# Patient Record
Sex: MALE | Race: WHITE | URBAN - METROPOLITAN AREA
[De-identification: names, ages, dates, MRNs, and addresses within clinical notes are randomized per-mention and may not be internally consistent; named-entity substitution may affect disease eponyms.]

---

## 2020-07-13 PROBLEM — Z00.00 ENCOUNTER FOR PREVENTIVE HEALTH EXAMINATION: Status: ACTIVE | Noted: 2020-07-13

## 2022-06-23 ENCOUNTER — OFFICE (OUTPATIENT)
Dept: URBAN - METROPOLITAN AREA CLINIC 12 | Facility: CLINIC | Age: 57
Setting detail: OPHTHALMOLOGY
End: 2022-06-23
Payer: OTHER MISCELLANEOUS

## 2022-06-23 DIAGNOSIS — H52.13: ICD-10-CM

## 2022-06-23 PROCEDURE — SCREF LASIK EVAL: Performed by: OPHTHALMOLOGY

## 2022-06-23 ASSESSMENT — TONOMETRY
OD_IOP_MMHG: 21
OS_IOP_MMHG: 15

## 2022-06-23 ASSESSMENT — REFRACTION_MANIFEST
OS_CYLINDER: -0.75
OS_AXIS: 72
OD_VA1: 20/20
OD_SPHERE: +2.00
OD_AXIS: 094
OD_CYLINDER: -0.75
OS_VA1: 20/20
OS_SPHERE: +1.25

## 2022-06-23 ASSESSMENT — KERATOMETRY
OS_K1POWER_DIOPTERS: 42.25
OD_K1POWER_DIOPTERS: 41.75
OD_AXISANGLE_DEGREES: 7
OS_K2POWER_DIOPTERS: 42.75
OS_AXISANGLE_DEGREES: 158
OD_K2POWER_DIOPTERS: 42.50

## 2022-06-23 ASSESSMENT — REFRACTION_CURRENTRX
OD_OVR_VA: 20/
OS_OVR_VA: 20/
OD_VPRISM_DIRECTION: SV
OD_AXIS: 88
OD_SPHERE: +1.75
OS_VPRISM_DIRECTION: SV
OS_SPHERE: +1.50
OS_CYLINDER: -0.50
OD_CYLINDER: -0.25
OS_AXIS: 71

## 2022-06-23 ASSESSMENT — SPHEQUIV_DERIVED
OS_SPHEQUIV: 0.875
OS_SPHEQUIV: 1.125
OD_SPHEQUIV: 1.625
OD_SPHEQUIV: 1.625

## 2022-06-23 ASSESSMENT — AXIALLENGTH_DERIVED
OS_AL: 23.5211
OD_AL: 23.4645
OD_AL: 23.4645
OS_AL: 23.6183

## 2022-06-23 ASSESSMENT — PACHYMETRY
OS_CT_UM: 532
OD_CT_UM: 533
OS_CT_CORRECTION: 1
OD_CT_CORRECTION: 1

## 2022-06-23 ASSESSMENT — REFRACTION_AUTOREFRACTION
OS_SPHERE: +1.50
OS_CYLINDER: -0.75
OD_AXIS: 94
OD_CYLINDER: -0.75
OS_AXIS: 72
OD_SPHERE: +2.00

## 2022-06-23 ASSESSMENT — VISUAL ACUITY
OD_BCVA: 20/20
OS_BCVA: 20/20

## 2022-06-23 ASSESSMENT — CONFRONTATIONAL VISUAL FIELD TEST (CVF)
OD_FINDINGS: FULL
OS_FINDINGS: FULL

## 2022-06-29 ENCOUNTER — OTHER LOCATION (OUTPATIENT)
Dept: URBAN - METROPOLITAN AREA LASIK CENTER 6 | Facility: LASIK CENTER | Age: 57
Setting detail: OPHTHALMOLOGY
End: 2022-06-29

## 2022-06-29 DIAGNOSIS — H52.13: ICD-10-CM

## 2022-06-29 PROCEDURE — 65760 KERATOMILEUSIS: CPT | Performed by: OPHTHALMOLOGY

## 2022-06-30 ENCOUNTER — OFFICE (OUTPATIENT)
Dept: URBAN - METROPOLITAN AREA CLINIC 12 | Facility: CLINIC | Age: 57
Setting detail: OPHTHALMOLOGY
End: 2022-06-30
Payer: OTHER MISCELLANEOUS

## 2022-06-30 DIAGNOSIS — H52.13: ICD-10-CM

## 2022-06-30 PROCEDURE — 99024 POSTOP FOLLOW-UP VISIT: CPT | Performed by: OPHTHALMOLOGY

## 2022-06-30 ASSESSMENT — KERATOMETRY
OD_K2POWER_DIOPTERS: 44.00
OD_K1POWER_DIOPTERS: 43.50
OS_K1POWER_DIOPTERS: 43.00
OS_AXISANGLE_DEGREES: 070
OD_AXISANGLE_DEGREES: 005
OS_K2POWER_DIOPTERS: 43.50

## 2022-06-30 ASSESSMENT — REFRACTION_AUTOREFRACTION
OS_SPHERE: +0.25
OS_AXIS: 164
OD_CYLINDER: -0.50
OD_SPHERE: -0.25
OD_AXIS: 005
OS_CYLINDER: -0.75

## 2022-06-30 ASSESSMENT — VISUAL ACUITY
OD_BCVA: 20/20
OS_BCVA: 20/20

## 2022-06-30 ASSESSMENT — REFRACTION_MANIFEST
OD_SPHERE: +2.00
OS_VA1: 20/20
OS_AXIS: 72
OS_CYLINDER: -0.75
OD_VA1: 20/20
OD_CYLINDER: -0.75
OD_AXIS: 094
OS_SPHERE: +1.25

## 2022-06-30 ASSESSMENT — SPHEQUIV_DERIVED
OS_SPHEQUIV: -0.125
OD_SPHEQUIV: 1.625
OS_SPHEQUIV: 0.875
OD_SPHEQUIV: -0.5

## 2022-06-30 ASSESSMENT — REFRACTION_CURRENTRX
OS_VPRISM_DIRECTION: SV
OS_OVR_VA: 20/
OS_SPHERE: +1.50
OS_AXIS: 71
OD_CYLINDER: -0.25
OD_AXIS: 88
OD_OVR_VA: 20/
OD_VPRISM_DIRECTION: SV
OS_CYLINDER: -0.50
OD_SPHERE: +1.75

## 2022-06-30 ASSESSMENT — AXIALLENGTH_DERIVED
OD_AL: 23.6953
OS_AL: 23.7333
OD_AL: 22.8886
OS_AL: 23.3454

## 2022-06-30 ASSESSMENT — CONFRONTATIONAL VISUAL FIELD TEST (CVF)
OD_FINDINGS: FULL
OS_FINDINGS: FULL

## 2022-07-06 ENCOUNTER — RX ONLY (RX ONLY)
Age: 57
End: 2022-07-06

## 2022-07-06 ENCOUNTER — OFFICE (OUTPATIENT)
Dept: URBAN - METROPOLITAN AREA CLINIC 12 | Facility: CLINIC | Age: 57
Setting detail: OPHTHALMOLOGY
End: 2022-07-06
Payer: OTHER MISCELLANEOUS

## 2022-07-06 DIAGNOSIS — H52.13: ICD-10-CM

## 2022-07-06 PROCEDURE — 99024 POSTOP FOLLOW-UP VISIT: CPT | Performed by: OPTOMETRIST

## 2022-07-06 ASSESSMENT — AXIALLENGTH_DERIVED
OS_AL: 23.4357
OD_AL: 22.9319
OS_AL: 23.777

## 2022-07-06 ASSESSMENT — REFRACTION_AUTOREFRACTION
OS_CYLINDER: -0.50
OS_SPHERE: +0.25
OD_SPHERE: PLANO
OD_AXIS: 3
OD_CYLINDER: -0.50
OS_AXIS: 173

## 2022-07-06 ASSESSMENT — REFRACTION_MANIFEST
OS_VA1: 20/20
OS_AXIS: 72
OD_CYLINDER: -0.75
OS_CYLINDER: -0.75
OD_SPHERE: +2.00
OD_AXIS: 094
OS_SPHERE: +1.25
OD_VA1: 20/20

## 2022-07-06 ASSESSMENT — KERATOMETRY
OD_K1POWER_DIOPTERS: 43.50
OD_AXISANGLE_DEGREES: 89
OS_K1POWER_DIOPTERS: 42.75
OS_K2POWER_DIOPTERS: 43.25
OD_K2POWER_DIOPTERS: 43.75
OS_AXISANGLE_DEGREES: 71

## 2022-07-06 ASSESSMENT — PACHYMETRY
OD_CT_UM: 533
OS_CT_CORRECTION: 1
OD_CT_CORRECTION: 1
OS_CT_UM: 532

## 2022-07-06 ASSESSMENT — REFRACTION_CURRENTRX
OS_OVR_VA: 20/
OD_VPRISM_DIRECTION: SV
OD_OVR_VA: 20/
OD_SPHERE: +1.75
OD_CYLINDER: -0.25
OD_AXIS: 88
OS_CYLINDER: -0.50
OS_VPRISM_DIRECTION: SV
OS_SPHERE: +1.50
OS_AXIS: 71

## 2022-07-06 ASSESSMENT — VISUAL ACUITY
OD_BCVA: 20/20+2
OS_BCVA: 20/15-2

## 2022-07-06 ASSESSMENT — SPHEQUIV_DERIVED
OS_SPHEQUIV: 0
OS_SPHEQUIV: 0.875
OD_SPHEQUIV: 1.625

## 2022-07-06 ASSESSMENT — CONFRONTATIONAL VISUAL FIELD TEST (CVF)
OS_FINDINGS: FULL
OD_FINDINGS: FULL

## 2022-07-06 ASSESSMENT — TONOMETRY
OS_IOP_MMHG: 15
OD_IOP_MMHG: 13

## 2022-07-28 ENCOUNTER — RX ONLY (RX ONLY)
Age: 57
End: 2022-07-28

## 2022-07-28 ENCOUNTER — OFFICE (OUTPATIENT)
Dept: URBAN - METROPOLITAN AREA CLINIC 12 | Facility: CLINIC | Age: 57
Setting detail: OPHTHALMOLOGY
End: 2022-07-28
Payer: OTHER MISCELLANEOUS

## 2022-07-28 DIAGNOSIS — H16.223: ICD-10-CM

## 2022-07-28 PROCEDURE — 99024 POSTOP FOLLOW-UP VISIT: CPT | Performed by: OPTOMETRIST

## 2022-07-28 ASSESSMENT — CORNEAL TRAUMA: OS_TRAUMA: SMALL ABRASION

## 2022-07-28 ASSESSMENT — REFRACTION_CURRENTRX
OD_SPHERE: +1.75
OS_CYLINDER: -0.50
OD_VPRISM_DIRECTION: SV
OS_VPRISM_DIRECTION: SV
OD_CYLINDER: -0.25
OD_OVR_VA: 20/
OS_AXIS: 71
OS_SPHERE: +1.50
OS_OVR_VA: 20/
OD_AXIS: 88

## 2022-07-28 ASSESSMENT — KERATOMETRY
OD_K1POWER_DIOPTERS: 43.50
OD_K2POWER_DIOPTERS: 44.00
METHOD_AUTO_MANUAL: AUTO
OD_AXISANGLE_DEGREES: 067
OS_K2POWER_DIOPTERS: 43.50
OS_AXISANGLE_DEGREES: 076
OS_K1POWER_DIOPTERS: 42.75

## 2022-07-28 ASSESSMENT — REFRACTION_MANIFEST
OS_AXIS: 72
OD_AXIS: 094
OS_CYLINDER: -0.75
OS_VA1: 20/20
OD_VA1: 20/20
OD_SPHERE: +2.00
OS_SPHERE: +1.25
OD_CYLINDER: -0.75

## 2022-07-28 ASSESSMENT — TONOMETRY
OS_IOP_MMHG: 17
OD_IOP_MMHG: 21

## 2022-07-28 ASSESSMENT — SUPERFICIAL PUNCTATE KERATITIS (SPK)
OS_SPK: 1+
OD_SPK: 1+

## 2022-07-28 ASSESSMENT — SPHEQUIV_DERIVED
OS_SPHEQUIV: 0.875
OS_SPHEQUIV: 0
OD_SPHEQUIV: 1.625

## 2022-07-28 ASSESSMENT — AXIALLENGTH_DERIVED
OS_AL: 23.3905
OD_AL: 22.8886
OS_AL: 23.7305

## 2022-07-28 ASSESSMENT — PACHYMETRY
OD_CT_CORRECTION: 1
OS_CT_CORRECTION: 1
OS_CT_UM: 532
OD_CT_UM: 533

## 2022-07-28 ASSESSMENT — REFRACTION_AUTOREFRACTION
OD_CYLINDER: -0.50
OD_SPHERE: PLANO
OD_AXIS: 176
OS_AXIS: 001
OS_CYLINDER: -0.50
OS_SPHERE: +0.25

## 2022-07-28 ASSESSMENT — VISUAL ACUITY
OD_BCVA: 20/20-1
OS_BCVA: 20/20-1

## 2022-07-28 ASSESSMENT — CONFRONTATIONAL VISUAL FIELD TEST (CVF)
OS_FINDINGS: FULL
OD_FINDINGS: FULL

## 2022-08-03 ENCOUNTER — OFFICE (OUTPATIENT)
Dept: URBAN - METROPOLITAN AREA CLINIC 12 | Facility: CLINIC | Age: 57
Setting detail: OPHTHALMOLOGY
End: 2022-08-03
Payer: OTHER MISCELLANEOUS

## 2022-08-03 DIAGNOSIS — H16.223: ICD-10-CM

## 2022-08-03 PROCEDURE — 99024 POSTOP FOLLOW-UP VISIT: CPT | Performed by: OPTOMETRIST

## 2022-08-03 ASSESSMENT — REFRACTION_CURRENTRX
OS_CYLINDER: -0.50
OS_OVR_VA: 20/
OD_CYLINDER: -0.25
OD_SPHERE: +1.75
OS_AXIS: 71
OD_OVR_VA: 20/
OD_VPRISM_DIRECTION: SV
OD_AXIS: 88
OS_SPHERE: +1.50
OS_VPRISM_DIRECTION: SV

## 2022-08-03 ASSESSMENT — REFRACTION_AUTOREFRACTION
OD_CYLINDER: -0.25
OD_SPHERE: PLANO
OS_AXIS: 176
OS_CYLINDER: -0.50
OD_AXIS: 179
OS_SPHERE: +0.75

## 2022-08-03 ASSESSMENT — REFRACTION_MANIFEST
OD_VA1: 20/20
OD_SPHERE: +2.00
OD_AXIS: 094
OD_CYLINDER: -0.75
OS_SPHERE: +1.25
OS_CYLINDER: -0.75
OS_AXIS: 72
OS_VA1: 20/20

## 2022-08-03 ASSESSMENT — KERATOMETRY
OD_K2POWER_DIOPTERS: 43.75
OS_K1POWER_DIOPTERS: 42.75
METHOD_AUTO_MANUAL: AUTO
OD_AXISANGLE_DEGREES: 072
OS_K2POWER_DIOPTERS: 43.25
OD_K1POWER_DIOPTERS: 43.25
OS_AXISANGLE_DEGREES: 070

## 2022-08-03 ASSESSMENT — SUPERFICIAL PUNCTATE KERATITIS (SPK)
OS_SPK: T
OD_SPK: T

## 2022-08-03 ASSESSMENT — SPHEQUIV_DERIVED
OS_SPHEQUIV: 0.5
OS_SPHEQUIV: 0.875
OD_SPHEQUIV: 1.625

## 2022-08-03 ASSESSMENT — PACHYMETRY
OD_CT_CORRECTION: 1
OS_CT_CORRECTION: 1
OS_CT_UM: 532
OD_CT_UM: 533

## 2022-08-03 ASSESSMENT — CONFRONTATIONAL VISUAL FIELD TEST (CVF)
OS_FINDINGS: FULL
OD_FINDINGS: FULL

## 2022-08-03 ASSESSMENT — AXIALLENGTH_DERIVED
OD_AL: 22.9753
OS_AL: 23.4357
OS_AL: 23.5808

## 2022-08-03 ASSESSMENT — TONOMETRY
OS_IOP_MMHG: 15
OD_IOP_MMHG: 20

## 2022-08-03 ASSESSMENT — VISUAL ACUITY
OD_BCVA: 20/15-2
OS_BCVA: 20/20

## 2022-08-10 ENCOUNTER — OFFICE (OUTPATIENT)
Dept: URBAN - METROPOLITAN AREA CLINIC 12 | Facility: CLINIC | Age: 57
Setting detail: OPHTHALMOLOGY
End: 2022-08-10
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
## 2022-08-10 PROCEDURE — NO SHOW FE NO SHOW FEE: Performed by: OPTOMETRIST

## 2022-09-22 ENCOUNTER — NON-APPOINTMENT (OUTPATIENT)
Age: 57
End: 2022-09-22

## 2022-09-22 RX ORDER — FLUTICASONE PROPIONATE 50 MCG
50 SPRAY, SUSPENSION NASAL
Refills: 0 | Status: ACTIVE | COMMUNITY

## 2022-09-22 RX ORDER — FEXOFENADINE HCL 60 MG
TABLET ORAL
Refills: 0 | Status: ACTIVE | COMMUNITY

## 2022-10-04 ENCOUNTER — APPOINTMENT (OUTPATIENT)
Dept: PEDIATRIC ALLERGY IMMUNOLOGY | Facility: CLINIC | Age: 57
End: 2022-10-04

## 2022-10-04 PROCEDURE — 95117 IMMUNOTHERAPY INJECTIONS: CPT

## 2022-10-04 PROCEDURE — 95165 ANTIGEN THERAPY SERVICES: CPT

## 2022-11-02 ENCOUNTER — APPOINTMENT (OUTPATIENT)
Dept: PEDIATRIC ALLERGY IMMUNOLOGY | Facility: CLINIC | Age: 57
End: 2022-11-02

## 2022-11-02 ENCOUNTER — OFFICE (OUTPATIENT)
Dept: URBAN - METROPOLITAN AREA CLINIC 12 | Facility: CLINIC | Age: 57
Setting detail: OPHTHALMOLOGY
End: 2022-11-02
Payer: OTHER MISCELLANEOUS

## 2022-11-02 ENCOUNTER — RX ONLY (RX ONLY)
Age: 57
End: 2022-11-02

## 2022-11-02 DIAGNOSIS — H16.223: ICD-10-CM

## 2022-11-02 PROBLEM — H52.13 LASIK- P/O LASIK; BOTH EYES: Status: ACTIVE | Noted: 2022-06-23

## 2022-11-02 PROBLEM — H11.153 PINGUECULA; BOTH EYES: Status: ACTIVE | Noted: 2022-06-23

## 2022-11-02 PROCEDURE — 95117 IMMUNOTHERAPY INJECTIONS: CPT

## 2022-11-02 PROCEDURE — 99024 POSTOP FOLLOW-UP VISIT: CPT | Performed by: OPTOMETRIST

## 2022-11-02 ASSESSMENT — KERATOMETRY
OD_K2POWER_DIOPTERS: 43.50
OD_K1POWER_DIOPTERS: 43.25
METHOD_AUTO_MANUAL: AUTO
OS_K1POWER_DIOPTERS: 42.75
OD_AXISANGLE_DEGREES: 077
OS_AXISANGLE_DEGREES: 076
OS_K2POWER_DIOPTERS: 43.50

## 2022-11-02 ASSESSMENT — REFRACTION_MANIFEST
OS_SPHERE: +1.25
OS_VA1: 20/20
OD_SPHERE: +2.00
OD_CYLINDER: -0.75
OS_CYLINDER: -0.75
OS_AXIS: 72
OD_AXIS: 094
OD_VA1: 20/20

## 2022-11-02 ASSESSMENT — PACHYMETRY
OS_CT_UM: 532
OD_CT_CORRECTION: 1
OD_CT_UM: 533
OS_CT_CORRECTION: 1

## 2022-11-02 ASSESSMENT — REFRACTION_CURRENTRX
OD_SPHERE: +1.75
OS_AXIS: 71
OS_OVR_VA: 20/
OD_AXIS: 88
OD_CYLINDER: -0.25
OS_SPHERE: +1.50
OD_OVR_VA: 20/
OS_VPRISM_DIRECTION: SV
OD_VPRISM_DIRECTION: SV
OS_CYLINDER: -0.50

## 2022-11-02 ASSESSMENT — TONOMETRY
OS_IOP_MMHG: 14
OD_IOP_MMHG: 18

## 2022-11-02 ASSESSMENT — CONFRONTATIONAL VISUAL FIELD TEST (CVF)
OS_FINDINGS: FULL
OD_FINDINGS: FULL

## 2022-11-02 ASSESSMENT — SPHEQUIV_DERIVED
OS_SPHEQUIV: 0.875
OS_SPHEQUIV: 0.125
OD_SPHEQUIV: 1.625

## 2022-11-02 ASSESSMENT — REFRACTION_AUTOREFRACTION
OD_SPHERE: PLANO
OS_AXIS: 178
OS_SPHERE: +0.50
OD_AXIS: 004
OS_CYLINDER: -0.75
OD_CYLINDER: -0.50

## 2022-11-02 ASSESSMENT — AXIALLENGTH_DERIVED
OS_AL: 23.3905
OD_AL: 23.019
OS_AL: 23.6813

## 2022-11-02 ASSESSMENT — SUPERFICIAL PUNCTATE KERATITIS (SPK)
OD_SPK: T
OS_SPK: T

## 2022-11-02 ASSESSMENT — VISUAL ACUITY
OD_BCVA: 20/15-3
OS_BCVA: 20/15-2

## 2022-11-29 ENCOUNTER — APPOINTMENT (OUTPATIENT)
Dept: PEDIATRIC ALLERGY IMMUNOLOGY | Facility: CLINIC | Age: 57
End: 2022-11-29

## 2022-11-29 PROCEDURE — 95117 IMMUNOTHERAPY INJECTIONS: CPT

## 2022-12-19 ENCOUNTER — APPOINTMENT (OUTPATIENT)
Dept: PEDIATRIC ALLERGY IMMUNOLOGY | Facility: CLINIC | Age: 57
End: 2022-12-19

## 2022-12-19 PROCEDURE — 95117 IMMUNOTHERAPY INJECTIONS: CPT

## 2022-12-19 PROCEDURE — 95165 ANTIGEN THERAPY SERVICES: CPT

## 2023-01-17 ENCOUNTER — APPOINTMENT (OUTPATIENT)
Dept: PULMONOLOGY | Facility: CLINIC | Age: 58
End: 2023-01-17

## 2023-01-17 ENCOUNTER — APPOINTMENT (OUTPATIENT)
Dept: PEDIATRIC ALLERGY IMMUNOLOGY | Facility: CLINIC | Age: 58
End: 2023-01-17
Payer: COMMERCIAL

## 2023-01-17 PROCEDURE — 95117 IMMUNOTHERAPY INJECTIONS: CPT

## 2023-02-14 ENCOUNTER — APPOINTMENT (OUTPATIENT)
Dept: PEDIATRIC ALLERGY IMMUNOLOGY | Facility: CLINIC | Age: 58
End: 2023-02-14
Payer: COMMERCIAL

## 2023-02-14 PROCEDURE — 95117 IMMUNOTHERAPY INJECTIONS: CPT

## 2023-03-15 ENCOUNTER — APPOINTMENT (OUTPATIENT)
Dept: PEDIATRIC ALLERGY IMMUNOLOGY | Facility: CLINIC | Age: 58
End: 2023-03-15
Payer: COMMERCIAL

## 2023-03-15 DIAGNOSIS — J30.1 ALLERGIC RHINITIS DUE TO POLLEN: ICD-10-CM

## 2023-03-15 DIAGNOSIS — J30.89 OTHER ALLERGIC RHINITIS: ICD-10-CM

## 2023-03-15 PROCEDURE — 95117 IMMUNOTHERAPY INJECTIONS: CPT

## 2023-04-12 ENCOUNTER — APPOINTMENT (OUTPATIENT)
Dept: PEDIATRIC ALLERGY IMMUNOLOGY | Facility: CLINIC | Age: 58
End: 2023-04-12
Payer: COMMERCIAL

## 2023-04-12 VITALS
DIASTOLIC BLOOD PRESSURE: 70 MMHG | OXYGEN SATURATION: 97 % | HEART RATE: 88 BPM | TEMPERATURE: 98 F | SYSTOLIC BLOOD PRESSURE: 126 MMHG | HEIGHT: 69.5 IN | BODY MASS INDEX: 33.59 KG/M2 | WEIGHT: 232 LBS

## 2023-04-12 DIAGNOSIS — G47.30 SLEEP APNEA, UNSPECIFIED: ICD-10-CM

## 2023-04-12 DIAGNOSIS — I10 ESSENTIAL (PRIMARY) HYPERTENSION: ICD-10-CM

## 2023-04-12 DIAGNOSIS — K21.9 GASTRO-ESOPHAGEAL REFLUX DISEASE W/OUT ESOPHAGITIS: ICD-10-CM

## 2023-04-12 PROCEDURE — 99213 OFFICE O/P EST LOW 20 MIN: CPT | Mod: 25

## 2023-04-12 PROCEDURE — 95117 IMMUNOTHERAPY INJECTIONS: CPT

## 2023-04-12 RX ORDER — FLUTICASONE PROPIONATE 50 UG/1
50 SPRAY, METERED NASAL TWICE DAILY
Qty: 3 | Refills: 3 | Status: ACTIVE | COMMUNITY
Start: 2023-04-12 | End: 1900-01-01

## 2023-04-12 NOTE — REVIEW OF SYSTEMS
[Nasal Congestion] : nasal congestion [Post Nasal Drip] : post nasal drip [Recurrent Sinus Infections] : no recurrent sinus infections [Recurrent Throat Infections] : no recurrence of throat infections [Recurrent Bronchitis] : no recurrent bronchitis [Recurrent Ear Infections] : no recurrence or ear infections [Recurrent Skin Infections] : no recurrent skin infections [Recurrent Pneumonia] : no ~T recurrent pneumonia

## 2023-04-12 NOTE — SOCIAL HISTORY
[de-identified] : House with carpet in the bedroom, window air conditioning, baseboard heating, 2 dogs, dust mite covers on mattress, no cigarette smoke exposure.

## 2023-04-12 NOTE — PHYSICAL EXAM
[Alert] : alert [No Acute Distress] : no acute distress [Normal Voice/Communication] : normal voice communication [Supple] : the neck was supple [Soft] : abdomen soft [Normal Cervical Lymph Nodes] : cervical [Skin Intact] : skin intact  [No clubbing] : no clubbing [No Cyanosis] : no cyanosis [Alert, Awake, Oriented as Age-Appropriate] : alert, awake, oriented as age appropriate [de-identified] : Mild conjunctival erythema bilaterally. [de-identified] : Throat clear.  Nasal mucosa pink, mild stuffy nose, scant white discharge.  Tympanic membranes dull bilaterally.  No sinus tenderness. [de-identified] : Chest clear, good air entry, no wheezing or crackles. [de-identified] : S1-S2 regular, no murmurs.

## 2023-04-12 NOTE — HISTORY OF PRESENT ILLNESS
[de-identified] : In office for yearly reevaluation for immunotherapy.  Followed in office from 3/17/1998, when he was evaluated for recurrent sinus infections and headaches.  He had allergies since childhood, with good response on immunotherapy for about 20 years.  When immunotherapy was stopped, within 1 year he developed recurrent sinus infections.  Resume immunotherapy in our office in April 1998.  Currently receiving immunotherapy with extracts of trees, weeds, grass, molds, and dust mites.  He carries EpiPen and he takes an antihistamine on immunotherapy day, due to high reactivity to molds and dust mites.  He tolerates immunotherapy well.  He feels immunotherapy is very helpful, he did not have sinus infections and he gets only occasional mild postnasal drip and stuffiness.  He uses Claritin on immunotherapy day and Flonase as needed.  He has no chest symptoms.\par Other medical history includes hypertension controlled on amlodipine, gastroesophageal reflux for which he uses omeprazole as needed, obstructive sleep apnea treated with a dental mouthpiece.  He had Lasik surgery.

## 2023-04-12 NOTE — ASSESSMENT
[FreeTextEntry1] : Allergic rhinitis (mold, dust mites, pollen): Continue immunotherapy every 4 weeks, since it is helpful to control symptoms.  Take Claritin as needed and on immunotherapy day.  Consider using Flonase 1 spray per nostril twice daily when he has increased symptoms rather than Claritin.\par Recurrent sinusitis: Much improved on immunotherapy, no sinus infections anymore.

## 2023-05-02 ENCOUNTER — OFFICE (OUTPATIENT)
Dept: URBAN - METROPOLITAN AREA CLINIC 12 | Facility: CLINIC | Age: 58
Setting detail: OPHTHALMOLOGY
End: 2023-05-02
Payer: OTHER MISCELLANEOUS

## 2023-05-02 DIAGNOSIS — H16.223: ICD-10-CM

## 2023-05-02 PROCEDURE — 99024 POSTOP FOLLOW-UP VISIT: CPT | Performed by: OPTOMETRIST

## 2023-05-02 ASSESSMENT — REFRACTION_MANIFEST
OD_SPHERE: +2.00
OS_CYLINDER: -0.75
OD_VA1: 20/20
OS_SPHERE: +1.25
OD_AXIS: 094
OD_CYLINDER: -0.75
OS_AXIS: 72
OS_VA1: 20/20

## 2023-05-02 ASSESSMENT — REFRACTION_CURRENTRX
OD_OVR_VA: 20/
OS_SPHERE: +1.50
OS_CYLINDER: -0.50
OS_AXIS: 71
OD_AXIS: 88
OD_CYLINDER: -0.25
OS_OVR_VA: 20/
OD_VPRISM_DIRECTION: SV
OD_SPHERE: +1.75
OS_VPRISM_DIRECTION: SV

## 2023-05-02 ASSESSMENT — PACHYMETRY
OD_CT_UM: 533
OS_CT_CORRECTION: 1
OS_CT_UM: 532
OD_CT_CORRECTION: 1

## 2023-05-02 ASSESSMENT — REFRACTION_AUTOREFRACTION
OD_CYLINDER: -0.50
OS_AXIS: 172
OD_AXIS: 020
OS_SPHERE: +0.50
OD_SPHERE: PLANO
OS_CYLINDER: -0.75

## 2023-05-02 ASSESSMENT — AXIALLENGTH_DERIVED
OS_AL: 23.3454
OS_AL: 23.6351
OD_AL: 22.9753

## 2023-05-02 ASSESSMENT — SPHEQUIV_DERIVED
OS_SPHEQUIV: 0.875
OS_SPHEQUIV: 0.125
OD_SPHEQUIV: 1.625

## 2023-05-02 ASSESSMENT — KERATOMETRY
OS_K2POWER_DIOPTERS: 43.50
OS_AXISANGLE_DEGREES: 075
OD_K1POWER_DIOPTERS: 43.50
METHOD_AUTO_MANUAL: AUTO
OD_AXISANGLE_DEGREES: 090
OD_K2POWER_DIOPTERS: 43.50
OS_K1POWER_DIOPTERS: 43.00

## 2023-05-02 ASSESSMENT — SUPERFICIAL PUNCTATE KERATITIS (SPK)
OS_SPK: T
OD_SPK: T

## 2023-05-02 ASSESSMENT — CONFRONTATIONAL VISUAL FIELD TEST (CVF)
OS_FINDINGS: FULL
OD_FINDINGS: FULL

## 2023-05-02 ASSESSMENT — TONOMETRY
OS_IOP_MMHG: 12
OD_IOP_MMHG: 17

## 2023-05-02 ASSESSMENT — VISUAL ACUITY
OS_BCVA: 20/20
OD_BCVA: 20/20-2

## 2023-05-08 ENCOUNTER — APPOINTMENT (OUTPATIENT)
Dept: PEDIATRIC ALLERGY IMMUNOLOGY | Facility: CLINIC | Age: 58
End: 2023-05-08
Payer: COMMERCIAL

## 2023-05-08 PROCEDURE — 95117 IMMUNOTHERAPY INJECTIONS: CPT

## 2023-06-05 ENCOUNTER — APPOINTMENT (OUTPATIENT)
Dept: PEDIATRIC ALLERGY IMMUNOLOGY | Facility: CLINIC | Age: 58
End: 2023-06-05
Payer: COMMERCIAL

## 2023-06-05 PROCEDURE — 95117 IMMUNOTHERAPY INJECTIONS: CPT

## 2023-06-05 PROCEDURE — 95165 ANTIGEN THERAPY SERVICES: CPT

## 2023-06-29 ENCOUNTER — APPOINTMENT (OUTPATIENT)
Dept: PEDIATRIC ALLERGY IMMUNOLOGY | Facility: CLINIC | Age: 58
End: 2023-06-29
Payer: COMMERCIAL

## 2023-06-29 PROCEDURE — 95117 IMMUNOTHERAPY INJECTIONS: CPT

## 2023-07-24 ENCOUNTER — EMERGENCY (EMERGENCY)
Facility: HOSPITAL | Age: 58
LOS: 1 days | Discharge: HOME | End: 2023-07-24
Attending: EMERGENCY MEDICINE
Payer: COMMERCIAL

## 2023-07-24 VITALS
DIASTOLIC BLOOD PRESSURE: 90 MMHG | WEIGHT: 229.94 LBS | SYSTOLIC BLOOD PRESSURE: 145 MMHG | RESPIRATION RATE: 17 BRPM | OXYGEN SATURATION: 97 % | TEMPERATURE: 98 F | HEART RATE: 78 BPM

## 2023-07-24 PROCEDURE — 73140 X-RAY EXAM OF FINGER(S): CPT

## 2023-07-24 PROCEDURE — 99283 EMERGENCY DEPT VISIT LOW MDM: CPT

## 2023-07-24 PROCEDURE — 73140 X-RAY EXAM OF FINGER(S): CPT | Mod: 26,LT

## 2023-07-24 PROCEDURE — 99284 EMERGENCY DEPT VISIT MOD MDM: CPT

## 2023-07-24 RX ORDER — ASPIRIN/CALCIUM CARB/MAGNESIUM 324 MG
1 TABLET ORAL
Refills: 0 | DISCHARGE

## 2023-07-24 RX ORDER — CEPHALEXIN 500 MG
1 CAPSULE ORAL
Qty: 28 | Refills: 0
Start: 2023-07-24 | End: 2023-08-06

## 2023-07-24 RX ORDER — AMLODIPINE BESYLATE 2.5 MG/1
0 TABLET ORAL
Refills: 0 | DISCHARGE

## 2023-07-24 NOTE — ED PROVIDER NOTE - NSFOLLOWUPINSTRUCTIONS_ED_ALL_ED_FT
Please follow up with a hand specialist/doctor about your finger.    Laceration    A laceration is a cut that goes through all of the layers of the skin and into the tissue that is right under the skin. Some lacerations heal on their own. Others need to be closed with skin adhesive strips, skin glue, stitches (sutures), or staples. Proper laceration care minimizes the risk of infection and helps the laceration to heal better.  If non-absorbable stitches or staples have been placed, they must be taken out within the time frame instructed by your healthcare provider.    SEEK IMMEDIATE MEDICAL CARE IF YOU HAVE ANY OF THE FOLLOWING SYMPTOMS: swelling around the wound, worsening pain, drainage from the wound, red streaking going away from your wound, inability to move finger or toe near the laceration, or discoloration of skin near the laceration.    Fracture    A fracture is a break in one of your bones. This can occur from a variety of injuries, especially traumatic ones. Symptoms include pain, bruising, or swelling. Do not use the injured limb. If a fracture is in one of the bones below your waist, do not put weight on that limb unless instructed to do so by your healthcare provider. Crutches or a cane may have been provided. A splint or cast may have been applied by your health care provider. Make sure to keep it dry and follow up with an orthopedist as instructed.    SEEK IMMEDIATE MEDICAL CARE IF YOU HAVE ANY OF THE FOLLOWING SYMPTOMS: numbness, tingling, increasing pain, or weakness in any part of the injured limb.

## 2023-07-24 NOTE — ED PROVIDER NOTE - CLINICAL SUMMARY MEDICAL DECISION MAKING FREE TEXT BOX
nearly 24 hour old crush to the left ring finger sent by  for avulsion and nail bed injury. tetanus was utd at . PMS intact. nail bed avulsed from the alteral side and cracked. no active bleeding. distal sensation intact. xray with distal avulsion. will treat as open fracture. nail attached at beside and splinted   advised on wound care abx coverage and fu with hand referral

## 2023-07-24 NOTE — ED ADULT TRIAGE NOTE - CHIEF COMPLAINT QUOTE
sent by [facility] for hand specialist consult ; + fx of left 4th digit phalanx fx, avulsion of nail s/p "tip got crushed btwn 2 pieces of metal ". rcvd tetanus and rx for keflex in [facility].

## 2023-07-24 NOTE — ED PROVIDER NOTE - PROVIDER TOKENS
PROVIDER:[TOKEN:[[id number]],FOLLOWUP:[Urgent]],PROVIDER:[TOKEN:[[id number]],FOLLOWUP:[Urgent]]

## 2023-07-24 NOTE — ED PROVIDER NOTE - PROGRESS NOTE DETAILS
DIANE SAMUEL: nearly 24 hour old crush to the left ring finger sent by  for avulsion and nail bed injury. tetanus was utd at . PMS intact. nail bed avulsed from the alteral side and cracked. no active bleeding. distal sensation intact. xray with distal avulsion. will treat as open fracture. nail attached at beside and splinted   advised on wound care abx coverage and fu with hand referral

## 2023-07-24 NOTE — ED PROVIDER NOTE - CARE PROVIDER_API CALL
Mary Puentes  Orthopaedic Surgery  [address] [place] [address]  Phone: [phone]  Fax: [phone]  Follow Up Time: Urgent    Harry Allan  Orthopaedic Surgery  [address] [place] [address]  Phone: [phone]  Fax: [phone]  Follow Up Time: Urgent

## 2023-07-24 NOTE — ED PROVIDER NOTE - NS ED ATTENDING STATEMENT MOD
This was a shared visit with the MEÑO. I reviewed and verified the documentation and independently performed the documented:

## 2023-07-24 NOTE — ED PROVIDER NOTE - SKIN WOUND TYPE
Attempted to reach patient to schedule appointment (OB). LVM with call back number.   
4th digit of LEFT hand has nail avulsion, nail is still attached at the base/avulsion(s)

## 2023-07-24 NOTE — ED PROVIDER NOTE - ADDITIONAL NOTES AND INSTRUCTIONS:
limited duty please, fracture to the left finger, needs splint in place and dressing on finger while working until cleared by orthopedics

## 2023-07-24 NOTE — ED PROVIDER NOTE - PATIENT PORTAL LINK FT
You can access the FollowMyHealth Patient Portal offered by [facility] by registering at the following website: [email or web address]. By joining [employer]’s FollowMyHealth portal, you will also be able to view your health information using other applications (apps) compatible with our system.

## 2023-07-24 NOTE — ED PROVIDER NOTE - OBJECTIVE STATEMENT
57 year old male reports to the ED from  Urgent Care in [place] s/p crush injury to the 4th digit of the LEFT hand yesterday. Patient states that he was loading a truck with heavy metals when some of the metal fell onto his hand, crushing his ring finger and causing an avulsion of the nail. Patient states that he cleansed the area with peroxide when he got home and went to urgent care the day after (today). Patient's finger was bandaged up with Xerofoam and gauze before being told to report to the ED for further care. Presently, patient complains of mild pain to the left distal finger and nail. No numbness, weakness, loss of sensation or other injuries. Tetanus vaccination updated at the Urgent care, patient given 500 mg KEFLEX at 9:50 AM.

## 2023-07-25 ENCOUNTER — APPOINTMENT (OUTPATIENT)
Dept: PEDIATRIC ALLERGY IMMUNOLOGY | Facility: CLINIC | Age: 58
End: 2023-07-25
Payer: COMMERCIAL

## 2023-07-25 PROBLEM — I10 ESSENTIAL (PRIMARY) HYPERTENSION: Chronic | Status: ACTIVE | Noted: 2023-07-24

## 2023-07-25 PROCEDURE — 95117 IMMUNOTHERAPY INJECTIONS: CPT

## 2023-07-25 NOTE — ED POST DISCHARGE NOTE - REASON FOR FOLLOW-UP
Other XR abnl, FB in finger, notes nothing broke off, possibility of something past, is a , NICK f/u today, feeling a lot better and was splinted, informed of fracture

## 2023-08-22 ENCOUNTER — APPOINTMENT (OUTPATIENT)
Dept: PEDIATRIC ALLERGY IMMUNOLOGY | Facility: CLINIC | Age: 58
End: 2023-08-22
Payer: COMMERCIAL

## 2023-08-22 PROCEDURE — 95117 IMMUNOTHERAPY INJECTIONS: CPT

## 2023-09-19 ENCOUNTER — APPOINTMENT (OUTPATIENT)
Dept: PEDIATRIC ALLERGY IMMUNOLOGY | Facility: CLINIC | Age: 58
End: 2023-09-19
Payer: COMMERCIAL

## 2023-09-19 PROCEDURE — 95117 IMMUNOTHERAPY INJECTIONS: CPT

## 2023-10-17 ENCOUNTER — APPOINTMENT (OUTPATIENT)
Dept: PEDIATRIC ALLERGY IMMUNOLOGY | Facility: CLINIC | Age: 58
End: 2023-10-17
Payer: COMMERCIAL

## 2023-10-17 PROCEDURE — 95117 IMMUNOTHERAPY INJECTIONS: CPT

## 2023-11-14 ENCOUNTER — APPOINTMENT (OUTPATIENT)
Dept: PEDIATRIC ALLERGY IMMUNOLOGY | Facility: CLINIC | Age: 58
End: 2023-11-14
Payer: COMMERCIAL

## 2023-11-14 PROCEDURE — 95117 IMMUNOTHERAPY INJECTIONS: CPT

## 2023-11-14 PROCEDURE — 95165 ANTIGEN THERAPY SERVICES: CPT

## 2023-12-13 ENCOUNTER — APPOINTMENT (OUTPATIENT)
Dept: PEDIATRIC ALLERGY IMMUNOLOGY | Facility: CLINIC | Age: 58
End: 2023-12-13
Payer: COMMERCIAL

## 2023-12-13 PROCEDURE — 95117 IMMUNOTHERAPY INJECTIONS: CPT

## 2024-01-10 ENCOUNTER — APPOINTMENT (OUTPATIENT)
Dept: PEDIATRIC ALLERGY IMMUNOLOGY | Facility: CLINIC | Age: 59
End: 2024-01-10
Payer: COMMERCIAL

## 2024-01-10 PROCEDURE — 95117 IMMUNOTHERAPY INJECTIONS: CPT

## 2024-02-07 ENCOUNTER — APPOINTMENT (OUTPATIENT)
Dept: PEDIATRIC ALLERGY IMMUNOLOGY | Facility: CLINIC | Age: 59
End: 2024-02-07
Payer: COMMERCIAL

## 2024-02-07 PROCEDURE — 95117 IMMUNOTHERAPY INJECTIONS: CPT

## 2024-03-06 ENCOUNTER — APPOINTMENT (OUTPATIENT)
Dept: PEDIATRIC ALLERGY IMMUNOLOGY | Facility: CLINIC | Age: 59
End: 2024-03-06
Payer: COMMERCIAL

## 2024-03-06 PROCEDURE — 95117 IMMUNOTHERAPY INJECTIONS: CPT

## 2024-04-02 ENCOUNTER — APPOINTMENT (OUTPATIENT)
Dept: PEDIATRIC ALLERGY IMMUNOLOGY | Facility: CLINIC | Age: 59
End: 2024-04-02
Payer: COMMERCIAL

## 2024-04-02 PROCEDURE — 95117 IMMUNOTHERAPY INJECTIONS: CPT

## 2024-05-01 ENCOUNTER — OFFICE (OUTPATIENT)
Dept: URBAN - METROPOLITAN AREA CLINIC 12 | Facility: CLINIC | Age: 59
Setting detail: OPHTHALMOLOGY
End: 2024-05-01
Payer: COMMERCIAL

## 2024-05-01 ENCOUNTER — APPOINTMENT (OUTPATIENT)
Dept: PEDIATRIC ALLERGY IMMUNOLOGY | Facility: CLINIC | Age: 59
End: 2024-05-01
Payer: COMMERCIAL

## 2024-05-01 DIAGNOSIS — H16.223: ICD-10-CM

## 2024-05-01 PROCEDURE — 92014 COMPRE OPH EXAM EST PT 1/>: CPT | Performed by: OPTOMETRIST

## 2024-05-01 PROCEDURE — 95117 IMMUNOTHERAPY INJECTIONS: CPT

## 2024-05-01 ASSESSMENT — CONFRONTATIONAL VISUAL FIELD TEST (CVF)
OS_FINDINGS: FULL
OD_FINDINGS: FULL

## 2024-05-29 ENCOUNTER — APPOINTMENT (OUTPATIENT)
Dept: PEDIATRIC ALLERGY IMMUNOLOGY | Facility: CLINIC | Age: 59
End: 2024-05-29
Payer: COMMERCIAL

## 2024-05-29 PROCEDURE — 95117 IMMUNOTHERAPY INJECTIONS: CPT

## 2024-06-26 ENCOUNTER — APPOINTMENT (OUTPATIENT)
Dept: PEDIATRIC ALLERGY IMMUNOLOGY | Facility: CLINIC | Age: 59
End: 2024-06-26
Payer: COMMERCIAL

## 2024-06-26 DIAGNOSIS — J30.89 OTHER ALLERGIC RHINITIS: ICD-10-CM

## 2024-06-26 DIAGNOSIS — J30.1 ALLERGIC RHINITIS DUE TO POLLEN: ICD-10-CM

## 2024-06-26 DIAGNOSIS — J32.9 CHRONIC SINUSITIS, UNSPECIFIED: ICD-10-CM

## 2024-06-26 PROCEDURE — 95117 IMMUNOTHERAPY INJECTIONS: CPT

## 2024-07-15 ENCOUNTER — APPOINTMENT (OUTPATIENT)
Dept: PEDIATRIC ALLERGY IMMUNOLOGY | Facility: CLINIC | Age: 59
End: 2024-07-15
Payer: COMMERCIAL

## 2024-07-15 PROCEDURE — 95165 ANTIGEN THERAPY SERVICES: CPT

## 2024-07-24 ENCOUNTER — APPOINTMENT (OUTPATIENT)
Dept: PEDIATRIC ALLERGY IMMUNOLOGY | Facility: CLINIC | Age: 59
End: 2024-07-24
Payer: COMMERCIAL

## 2024-07-24 DIAGNOSIS — J30.1 ALLERGIC RHINITIS DUE TO POLLEN: ICD-10-CM

## 2024-07-24 DIAGNOSIS — J32.9 CHRONIC SINUSITIS, UNSPECIFIED: ICD-10-CM

## 2024-07-24 DIAGNOSIS — J30.89 OTHER ALLERGIC RHINITIS: ICD-10-CM

## 2024-07-24 PROCEDURE — 95117 IMMUNOTHERAPY INJECTIONS: CPT

## 2024-08-22 ENCOUNTER — APPOINTMENT (OUTPATIENT)
Dept: PEDIATRIC ALLERGY IMMUNOLOGY | Facility: CLINIC | Age: 59
End: 2024-08-22
Payer: COMMERCIAL

## 2024-08-22 DIAGNOSIS — J30.89 OTHER ALLERGIC RHINITIS: ICD-10-CM

## 2024-08-22 DIAGNOSIS — J30.1 ALLERGIC RHINITIS DUE TO POLLEN: ICD-10-CM

## 2024-08-22 DIAGNOSIS — J32.9 CHRONIC SINUSITIS, UNSPECIFIED: ICD-10-CM

## 2024-08-22 PROCEDURE — 95117 IMMUNOTHERAPY INJECTIONS: CPT

## 2024-09-18 ENCOUNTER — APPOINTMENT (OUTPATIENT)
Dept: PEDIATRIC ALLERGY IMMUNOLOGY | Facility: CLINIC | Age: 59
End: 2024-09-18
Payer: COMMERCIAL

## 2024-09-18 DIAGNOSIS — J30.1 ALLERGIC RHINITIS DUE TO POLLEN: ICD-10-CM

## 2024-09-18 DIAGNOSIS — J32.9 CHRONIC SINUSITIS, UNSPECIFIED: ICD-10-CM

## 2024-09-18 DIAGNOSIS — J30.89 OTHER ALLERGIC RHINITIS: ICD-10-CM

## 2024-09-18 PROCEDURE — 95117 IMMUNOTHERAPY INJECTIONS: CPT

## 2024-10-16 ENCOUNTER — APPOINTMENT (OUTPATIENT)
Dept: PEDIATRIC ALLERGY IMMUNOLOGY | Facility: CLINIC | Age: 59
End: 2024-10-16
Payer: COMMERCIAL

## 2024-10-16 DIAGNOSIS — J30.89 OTHER ALLERGIC RHINITIS: ICD-10-CM

## 2024-10-16 DIAGNOSIS — J30.1 ALLERGIC RHINITIS DUE TO POLLEN: ICD-10-CM

## 2024-10-16 DIAGNOSIS — J32.9 CHRONIC SINUSITIS, UNSPECIFIED: ICD-10-CM

## 2024-10-16 PROCEDURE — 95117 IMMUNOTHERAPY INJECTIONS: CPT

## 2024-11-12 ENCOUNTER — APPOINTMENT (OUTPATIENT)
Dept: PEDIATRIC ALLERGY IMMUNOLOGY | Facility: CLINIC | Age: 59
End: 2024-11-12
Payer: COMMERCIAL

## 2024-11-12 DIAGNOSIS — J30.89 OTHER ALLERGIC RHINITIS: ICD-10-CM

## 2024-11-12 DIAGNOSIS — J30.1 ALLERGIC RHINITIS DUE TO POLLEN: ICD-10-CM

## 2024-11-12 DIAGNOSIS — J32.9 CHRONIC SINUSITIS, UNSPECIFIED: ICD-10-CM

## 2024-11-12 PROCEDURE — 95117 IMMUNOTHERAPY INJECTIONS: CPT

## 2024-11-13 ENCOUNTER — APPOINTMENT (OUTPATIENT)
Dept: PEDIATRIC ALLERGY IMMUNOLOGY | Facility: CLINIC | Age: 59
End: 2024-11-13

## 2024-12-10 ENCOUNTER — APPOINTMENT (OUTPATIENT)
Dept: PEDIATRIC ALLERGY IMMUNOLOGY | Facility: CLINIC | Age: 59
End: 2024-12-10
Payer: COMMERCIAL

## 2024-12-10 PROCEDURE — 95117 IMMUNOTHERAPY INJECTIONS: CPT

## 2024-12-16 ENCOUNTER — APPOINTMENT (OUTPATIENT)
Dept: PEDIATRIC ALLERGY IMMUNOLOGY | Facility: CLINIC | Age: 59
End: 2024-12-16
Payer: COMMERCIAL

## 2024-12-16 DIAGNOSIS — J30.89 OTHER ALLERGIC RHINITIS: ICD-10-CM

## 2024-12-16 DIAGNOSIS — J30.1 ALLERGIC RHINITIS DUE TO POLLEN: ICD-10-CM

## 2024-12-16 DIAGNOSIS — J32.9 CHRONIC SINUSITIS, UNSPECIFIED: ICD-10-CM

## 2024-12-16 PROCEDURE — 95165 ANTIGEN THERAPY SERVICES: CPT

## 2025-01-07 ENCOUNTER — APPOINTMENT (OUTPATIENT)
Dept: PEDIATRIC ALLERGY IMMUNOLOGY | Facility: CLINIC | Age: 60
End: 2025-01-07
Payer: COMMERCIAL

## 2025-01-07 DIAGNOSIS — J30.1 ALLERGIC RHINITIS DUE TO POLLEN: ICD-10-CM

## 2025-01-07 DIAGNOSIS — J30.89 OTHER ALLERGIC RHINITIS: ICD-10-CM

## 2025-01-07 DIAGNOSIS — J32.9 CHRONIC SINUSITIS, UNSPECIFIED: ICD-10-CM

## 2025-01-07 PROCEDURE — 95117 IMMUNOTHERAPY INJECTIONS: CPT

## 2025-01-22 ENCOUNTER — APPOINTMENT (OUTPATIENT)
Dept: PEDIATRIC ALLERGY IMMUNOLOGY | Facility: CLINIC | Age: 60
End: 2025-01-22
Payer: COMMERCIAL

## 2025-01-22 DIAGNOSIS — J30.1 ALLERGIC RHINITIS DUE TO POLLEN: ICD-10-CM

## 2025-01-22 DIAGNOSIS — J32.9 CHRONIC SINUSITIS, UNSPECIFIED: ICD-10-CM

## 2025-01-22 DIAGNOSIS — J30.89 OTHER ALLERGIC RHINITIS: ICD-10-CM

## 2025-01-22 PROCEDURE — 95165 ANTIGEN THERAPY SERVICES: CPT

## 2025-02-05 ENCOUNTER — APPOINTMENT (OUTPATIENT)
Dept: PEDIATRIC ALLERGY IMMUNOLOGY | Facility: CLINIC | Age: 60
End: 2025-02-05
Payer: COMMERCIAL

## 2025-02-05 DIAGNOSIS — J30.89 OTHER ALLERGIC RHINITIS: ICD-10-CM

## 2025-02-05 DIAGNOSIS — J32.9 CHRONIC SINUSITIS, UNSPECIFIED: ICD-10-CM

## 2025-02-05 DIAGNOSIS — J30.1 ALLERGIC RHINITIS DUE TO POLLEN: ICD-10-CM

## 2025-02-05 PROCEDURE — 95117 IMMUNOTHERAPY INJECTIONS: CPT

## 2025-03-05 ENCOUNTER — APPOINTMENT (OUTPATIENT)
Dept: PEDIATRIC ALLERGY IMMUNOLOGY | Facility: CLINIC | Age: 60
End: 2025-03-05
Payer: COMMERCIAL

## 2025-03-05 DIAGNOSIS — J30.89 OTHER ALLERGIC RHINITIS: ICD-10-CM

## 2025-03-05 DIAGNOSIS — J30.1 ALLERGIC RHINITIS DUE TO POLLEN: ICD-10-CM

## 2025-03-05 DIAGNOSIS — J32.9 CHRONIC SINUSITIS, UNSPECIFIED: ICD-10-CM

## 2025-03-05 PROCEDURE — 95117 IMMUNOTHERAPY INJECTIONS: CPT

## 2025-04-02 ENCOUNTER — APPOINTMENT (OUTPATIENT)
Dept: PEDIATRIC ALLERGY IMMUNOLOGY | Facility: CLINIC | Age: 60
End: 2025-04-02
Payer: COMMERCIAL

## 2025-04-02 DIAGNOSIS — J30.1 ALLERGIC RHINITIS DUE TO POLLEN: ICD-10-CM

## 2025-04-02 DIAGNOSIS — J32.9 CHRONIC SINUSITIS, UNSPECIFIED: ICD-10-CM

## 2025-04-02 DIAGNOSIS — J30.89 OTHER ALLERGIC RHINITIS: ICD-10-CM

## 2025-04-02 PROCEDURE — 95117 IMMUNOTHERAPY INJECTIONS: CPT

## 2025-04-30 ENCOUNTER — APPOINTMENT (OUTPATIENT)
Dept: PEDIATRIC ALLERGY IMMUNOLOGY | Facility: CLINIC | Age: 60
End: 2025-04-30
Payer: COMMERCIAL

## 2025-04-30 DIAGNOSIS — J32.9 CHRONIC SINUSITIS, UNSPECIFIED: ICD-10-CM

## 2025-04-30 DIAGNOSIS — J30.1 ALLERGIC RHINITIS DUE TO POLLEN: ICD-10-CM

## 2025-04-30 DIAGNOSIS — J30.89 OTHER ALLERGIC RHINITIS: ICD-10-CM

## 2025-04-30 PROCEDURE — 95117 IMMUNOTHERAPY INJECTIONS: CPT

## 2025-05-07 ENCOUNTER — OFFICE (OUTPATIENT)
Dept: URBAN - METROPOLITAN AREA CLINIC 12 | Facility: CLINIC | Age: 60
Setting detail: OPHTHALMOLOGY
End: 2025-05-07
Payer: COMMERCIAL

## 2025-05-07 DIAGNOSIS — H16.223: ICD-10-CM

## 2025-05-07 PROCEDURE — 92014 COMPRE OPH EXAM EST PT 1/>: CPT | Performed by: OPTOMETRIST

## 2025-05-07 ASSESSMENT — REFRACTION_CURRENTRX
OD_VPRISM_DIRECTION: SV
OS_VPRISM_DIRECTION: SV
OD_SPHERE: +1.75
OD_OVR_VA: 20/
OS_CYLINDER: -0.50
OS_AXIS: 71
OD_AXIS: 88
OD_CYLINDER: -0.25
OS_SPHERE: +1.50
OS_OVR_VA: 20/

## 2025-05-07 ASSESSMENT — REFRACTION_MANIFEST
OS_VA1: 20/20
OD_AXIS: 094
OD_CYLINDER: -0.75
OS_SPHERE: +1.25
OS_CYLINDER: -0.75
OD_VA1: 20/20
OD_SPHERE: +2.00
OS_AXIS: 72

## 2025-05-07 ASSESSMENT — KERATOMETRY
METHOD_AUTO_MANUAL: AUTO
OD_K2POWER_DIOPTERS: 43.25
OS_AXISANGLE_DEGREES: 072
OD_K1POWER_DIOPTERS: 43.00
OD_AXISANGLE_DEGREES: 083
OS_K2POWER_DIOPTERS: 43.25
OS_K1POWER_DIOPTERS: 42.75

## 2025-05-07 ASSESSMENT — SUPERFICIAL PUNCTATE KERATITIS (SPK)
OD_SPK: T
OS_SPK: T

## 2025-05-07 ASSESSMENT — VISUAL ACUITY
OS_BCVA: 20/20
OD_BCVA: 20/20

## 2025-05-07 ASSESSMENT — TONOMETRY
OD_IOP_MMHG: 13
OS_IOP_MMHG: 13

## 2025-05-07 ASSESSMENT — REFRACTION_AUTOREFRACTION
OD_AXIS: 177
OS_CYLINDER: -0.25
OS_SPHERE: +0.75
OS_AXIS: 169
OD_CYLINDER: -0.25
OD_SPHERE: +0.75

## 2025-05-07 ASSESSMENT — CONFRONTATIONAL VISUAL FIELD TEST (CVF)
OD_FINDINGS: FULL
OS_FINDINGS: FULL

## 2025-05-07 ASSESSMENT — PACHYMETRY
OD_CT_UM: 533
OD_CT_CORRECTION: 1
OS_CT_UM: 532
OS_CT_CORRECTION: 1

## 2025-05-28 ENCOUNTER — APPOINTMENT (OUTPATIENT)
Dept: PEDIATRIC ALLERGY IMMUNOLOGY | Facility: CLINIC | Age: 60
End: 2025-05-28
Payer: COMMERCIAL

## 2025-05-28 DIAGNOSIS — J30.89 OTHER ALLERGIC RHINITIS: ICD-10-CM

## 2025-05-28 DIAGNOSIS — J30.1 ALLERGIC RHINITIS DUE TO POLLEN: ICD-10-CM

## 2025-05-28 DIAGNOSIS — J32.9 CHRONIC SINUSITIS, UNSPECIFIED: ICD-10-CM

## 2025-05-28 PROCEDURE — 95117 IMMUNOTHERAPY INJECTIONS: CPT

## 2025-06-24 ENCOUNTER — APPOINTMENT (OUTPATIENT)
Dept: PEDIATRIC ALLERGY IMMUNOLOGY | Facility: CLINIC | Age: 60
End: 2025-06-24
Payer: COMMERCIAL

## 2025-06-24 PROCEDURE — 95117 IMMUNOTHERAPY INJECTIONS: CPT

## 2025-07-22 ENCOUNTER — APPOINTMENT (OUTPATIENT)
Dept: PEDIATRIC ALLERGY IMMUNOLOGY | Facility: CLINIC | Age: 60
End: 2025-07-22
Payer: COMMERCIAL

## 2025-07-22 DIAGNOSIS — J30.89 OTHER ALLERGIC RHINITIS: ICD-10-CM

## 2025-07-22 DIAGNOSIS — J30.1 ALLERGIC RHINITIS DUE TO POLLEN: ICD-10-CM

## 2025-07-22 DIAGNOSIS — J32.9 CHRONIC SINUSITIS, UNSPECIFIED: ICD-10-CM

## 2025-07-22 PROCEDURE — 95117 IMMUNOTHERAPY INJECTIONS: CPT

## 2025-08-19 ENCOUNTER — APPOINTMENT (OUTPATIENT)
Dept: PEDIATRIC ALLERGY IMMUNOLOGY | Facility: CLINIC | Age: 60
End: 2025-08-19
Payer: COMMERCIAL

## 2025-08-19 DIAGNOSIS — J30.89 OTHER ALLERGIC RHINITIS: ICD-10-CM

## 2025-08-19 DIAGNOSIS — J30.1 ALLERGIC RHINITIS DUE TO POLLEN: ICD-10-CM

## 2025-08-19 DIAGNOSIS — J32.9 CHRONIC SINUSITIS, UNSPECIFIED: ICD-10-CM

## 2025-08-19 PROCEDURE — 95117 IMMUNOTHERAPY INJECTIONS: CPT

## 2025-09-16 ENCOUNTER — APPOINTMENT (OUTPATIENT)
Dept: PEDIATRIC ALLERGY IMMUNOLOGY | Facility: CLINIC | Age: 60
End: 2025-09-16
Payer: COMMERCIAL

## 2025-09-16 DIAGNOSIS — J32.9 CHRONIC SINUSITIS, UNSPECIFIED: ICD-10-CM

## 2025-09-16 DIAGNOSIS — J30.1 ALLERGIC RHINITIS DUE TO POLLEN: ICD-10-CM

## 2025-09-16 DIAGNOSIS — J30.89 OTHER ALLERGIC RHINITIS: ICD-10-CM

## 2025-09-16 PROCEDURE — 95117 IMMUNOTHERAPY INJECTIONS: CPT
